# Patient Record
Sex: FEMALE | Race: OTHER | HISPANIC OR LATINO | URBAN - METROPOLITAN AREA
[De-identification: names, ages, dates, MRNs, and addresses within clinical notes are randomized per-mention and may not be internally consistent; named-entity substitution may affect disease eponyms.]

---

## 2021-12-28 ENCOUNTER — EMERGENCY (EMERGENCY)
Facility: HOSPITAL | Age: 36
LOS: 1 days | Discharge: HOME | End: 2021-12-28
Attending: EMERGENCY MEDICINE
Payer: MEDICAID

## 2021-12-28 VITALS
OXYGEN SATURATION: 97 % | DIASTOLIC BLOOD PRESSURE: 85 MMHG | HEART RATE: 79 BPM | WEIGHT: 136.03 LBS | TEMPERATURE: 98 F | SYSTOLIC BLOOD PRESSURE: 129 MMHG | RESPIRATION RATE: 16 BRPM

## 2021-12-28 LAB — SARS-COV-2 RNA SPEC QL NAA+PROBE: DETECTED

## 2021-12-28 PROCEDURE — 99283 EMERGENCY DEPT VISIT LOW MDM: CPT

## 2021-12-28 PROCEDURE — 87635 SARS-COV-2 COVID-19 AMP PRB: CPT

## 2021-12-28 PROCEDURE — 99284 EMERGENCY DEPT VISIT MOD MDM: CPT

## 2021-12-28 RX ORDER — CIPROFLOXACIN AND DEXAMETHASONE 3; 1 MG/ML; MG/ML
4 SUSPENSION/ DROPS AURICULAR (OTIC)
Qty: 7.5 | Refills: 0
Start: 2021-12-28 | End: 2022-01-03

## 2021-12-28 NOTE — ED PROVIDER NOTE - PHYSICAL EXAMINATION
Afebrile, hemodynamically stable, saturating well  NAD, well appearing, walking comfortably in ED, no WOB, speaking full sentences  Head NCAT  EOMI grossly, anicteric  MMM, erythema with no swelling or exudates/lesions  R ear canal edematous, debris, unable to visualize TM  Breathing comfortably on RA  AAO, CN's 3-12 grossly intact  GUILLEN spontaneously  Skin warm, well perfused, no rashes or hives

## 2021-12-28 NOTE — ED PROVIDER NOTE - OBJECTIVE STATEMENT
[place] [id number]  36yoF at 8 wks' gestation, with ear pain x 15 days, also concerned for having a cold due to ST. Also having yellowish discharge from the R ear, cleaning it with Qtips. Denies fever, rash, abdominal pain, vag bleeding discharge.

## 2021-12-28 NOTE — ED ADULT NURSE NOTE - OBJECTIVE STATEMENT
pt is here for ear pain.  pt stated that right ear pain x 15 days, sore throat x 3 days, 8 weeks pregnant,

## 2021-12-28 NOTE — ED PROVIDER NOTE - NSFOLLOWUPINSTRUCTIONS_ED_ALL_ED_FT
[place] un seguimiento con daniels médico de atención primaria en 1-2 días.  Utilice acetaminofén según sea necesario para el dolor.  Utilice la gota para los oídos según lo prescrito.  Regrese al departamento de emergencias si tiene un dolor severo que empeora, vómitos, fiebre o cualquier otro síntoma.      Otitis Externa     La otitis externa es carla infección del canal auditivo externo. El canal auditivo externo es la amanda que está entre el exterior de la oreja y el tímpano. A la otitis externa también se la llama oído de nadador.    ¿Cuáles son las causas?  Las causas más frecuentes de esta afección incluyen las siguientes:  •Nadar en agua sucia.   •Humedad en el oído.   •Carla lesión en el interior del oído.   •Un objeto atascado en el oído.   •Un cornelio o rasguño en la parte exterior del oído.    ¿Qué incrementa el riesgo?    Es más probable que tenga esta afección si nada con frecuencia.    ¿Cuáles son los signos o los síntomas?  •Picazón en el oído. A menudo, haseeb es el primer síntoma.  •Hinchazón del oído.   •Enrojecimiento del oído.   •Dolor de oído. El dolor puede empeorar cuando se ariel de la oreja.   •Secreción de pus del oído.    ¿Cómo se trata?  El tratamiento de esta afección puede incluir:  •Gotas óticas con antibiótico. Generalmente se aplican javier 10 a 14 días.   •Medicamentos para reducir la picazón y la hinchazón.    Siga estas indicaciones en daniels casa:  •Si le dieron gotas óticas con antibiótico, úselas según las indicaciones del médico. No deje de usarlas aunque la afección mejore.  •[place] los medicamentos de venta shirlene y los recetados solamente nguyen se lo haya indicado el médico.  •Evite que le entre agua en los oídos nguyen se lo haya indicado el médico. Es posible que le indiquen que no nade ni practique deportes de agua javier algunos días.  •Concurra a todas las visitas de control nguyen se lo haya indicado el médico. [place] es importante.    ¿Cómo se russ?  •Mantenga secos los oídos. Use la punta de carla toalla para secarse los oídos después de nadar o de darse un baño.  •Trate de no rascarse ni ponerse objetos en el oído. Estas acciones facilitan la proliferación de microbios en el oído.  •Evite nadar en rohit, en agua sucia o en piscinas que puedan no tener la cantidad correcta de un producto químico llamado cloro.    Comuníquese con un médico si:  •Tiene fiebre.  •El oído sigue mcdonald, hinchado o le duele después de 3 días.  •Aún le sale pus del oído después de 3 días.  •El dolor, la hinchazón o el enrojecimiento empeoran.  •Siente un dolor de erika muy intenso.  •Tiene enrojecimiento, hinchazón, dolor o sensibilidad detrás de la oreja.    Resumen  •La otitis externa es carla infección del canal auditivo externo.  •Los síntomas son dolor, enrojecimiento e hinchazón del oído.  •Si le dieron gotas óticas con antibiótico, úselas según las indicaciones del médico. No deje de usarlas aunque la afección mejore.  •Trate de no rascarse ni ponerse objetos en el oído.    Esta información no tiene nguyen fin reemplazar el consejo del médico. Asegúrese de hacerle al médico cualquier pregunta que tenga.

## 2021-12-28 NOTE — ED PROVIDER NOTE - NS ED MD DISPO DISCHARGE CCDA
Called pt regarding below message. Advised pt of next available NP appt. Pt agreed to schedule. Appt date, time, and location given. Pt verbalized understanding with no further questions.     ----- Message from Nancy Parra sent at 12/9/2019  3:09 PM CST -----  Contact: pt   Pt stated she calling about a referral to schedule an appointment, she can be reached at [phone] Thanks      Patient/Caregiver provided printed discharge information.

## 2021-12-28 NOTE — ED PROVIDER NOTE - CLINICAL SUMMARY MEDICAL DECISION MAKING FREE TEXT BOX
A lot of debris in canal, significantly cleared with curette. Unable to visualize TM. Patient is well appearing, NAD, afebrile, hemodynamically stable. Symptoms c/w URI. Discharged with rx ear drops, instructions in further symptomatic care, return precautions, and need for PMD f/u.

## 2021-12-28 NOTE — ED PROVIDER NOTE - PATIENT PORTAL LINK FT
You can access the FollowMyHealth Patient Portal offered by [facility] by registering at the following website: [email or web address]. By joining [facility]’s FollowMyHealth portal, you will also be able to view your health information using other applications (apps) compatible with our system.